# Patient Record
Sex: FEMALE
[De-identification: names, ages, dates, MRNs, and addresses within clinical notes are randomized per-mention and may not be internally consistent; named-entity substitution may affect disease eponyms.]

---

## 2019-01-22 ENCOUNTER — HOSPITAL ENCOUNTER (OUTPATIENT)
Dept: HOSPITAL 14 - H.ER | Age: 49
Setting detail: OBSERVATION
LOS: 2 days | Discharge: HOME | End: 2019-01-24
Attending: FAMILY MEDICINE | Admitting: FAMILY MEDICINE
Payer: COMMERCIAL

## 2019-01-22 DIAGNOSIS — Z23: ICD-10-CM

## 2019-01-22 DIAGNOSIS — J40: Primary | ICD-10-CM

## 2019-01-22 DIAGNOSIS — E66.9: ICD-10-CM

## 2019-01-22 PROCEDURE — 84484 ASSAY OF TROPONIN QUANT: CPT

## 2019-01-22 PROCEDURE — 90471 IMMUNIZATION ADMIN: CPT

## 2019-01-22 PROCEDURE — 83880 ASSAY OF NATRIURETIC PEPTIDE: CPT

## 2019-01-22 PROCEDURE — 36415 COLL VENOUS BLD VENIPUNCTURE: CPT

## 2019-01-22 PROCEDURE — 85025 COMPLETE CBC W/AUTO DIFF WBC: CPT

## 2019-01-22 PROCEDURE — 87804 INFLUENZA ASSAY W/OPTIC: CPT

## 2019-01-22 PROCEDURE — 81025 URINE PREGNANCY TEST: CPT

## 2019-01-22 PROCEDURE — 71046 X-RAY EXAM CHEST 2 VIEWS: CPT

## 2019-01-22 PROCEDURE — 90732 PPSV23 VACC 2 YRS+ SUBQ/IM: CPT

## 2019-01-22 PROCEDURE — 80053 COMPREHEN METABOLIC PANEL: CPT

## 2019-01-22 PROCEDURE — 80048 BASIC METABOLIC PNL TOTAL CA: CPT

## 2019-01-22 PROCEDURE — 94640 AIRWAY INHALATION TREATMENT: CPT

## 2019-01-22 PROCEDURE — 99281 EMR DPT VST MAYX REQ PHY/QHP: CPT

## 2019-01-22 NOTE — ED PDOC
HPI: SOB/CHF/COPD


Time Seen by Provider: 19 23:19


Chief Complaint (Nursing): Shortness Of Breath


Chief Complaint (Provider): sob


History Per: Patient


History/Exam Limitations: no limitations


Onset/Duration Of Symptoms: Days (1 month)


Current Symptoms Are (Timing): Still Present


Additional Complaint(s): 





47 y/o female presents for evaluation of dry cough, shortness of breath x 1 

month.  Patient was seen by Urgent Care and PMD multiple times and prescribed 

albuterol inhaler and nebulizer without improvement.  Patient sent to ED by PMD 

for further evaluation.  Denies fever, nausea/vomiting, chest pain, 

palpitations, leg pain/swelling, recent travel, OCP use, tobacco use.  Last 

albuterol neb treatment prior to arrival





Past Medical History


Reviewed: Historical Data, Nursing Documentation, Vital Signs


Vital Signs: 





                                Last Vital Signs











Temp  97.5 F L  19 23:10


 


Pulse  109 H  19 23:10


 


Resp  26 H  19 23:10


 


BP  144/84   19 23:10


 


Pulse Ox  95   19 23:10














- Medical History


PMH: No Chronic Diseases





- Surgical History


Surgical History: 





- Family History


Family History: States: No Known Family Hx





- Living Arrangements


Living Arrangements: With Family





- Social History


Current smoker - smoking cessation education provided: No


Alcohol: None


Drugs: Denies





- Home Medications


Home Medications: 


                                Ambulatory Orders











 Medication  Instructions  Recorded


 


Albuterol 0.083% [Albuterol 3 ml IH Q4 PRN 19





Sulfate 3 Ml]  


 


Carvedilol [Coreg] 3.125 mg PO DAILY 19


 


LORazepam [Ativan] 0.5 tab PO DAILY PRN 19


 


Oxybutynin [Oxybutynin Chloride] 5 mg PO DAILY 19


 


Oxycodone HCl/Acetaminophen 1 tab PO Q4 PRN 19





[Acetaminophen-Oxycodone 325 mg-5  





mg]  


 


Patient Own Control 1 unit PO Q30D 19


 


Patient Own Control 2 puff PO BID 19


 


Patient Own Control 600 tab PO BID 19


 


Promethazine HCl/Codeine 5 ml PO Q4 PRN 19





[Prometh-Codein 6.25-10 mg/5 ml]  














- Allergies


Allergies/Adverse Reactions: 


                                    Allergies











Allergy/AdvReac Type Severity Reaction Status Date / Time


 


No Known Allergies Allergy   Verified 19 23:10














Review of Systems


ROS Statement: Except As Marked, All Systems Reviewed And Found Negative


Respiratory: Positive for: Cough, Shortness of Breath





Physical Exam





- Reviewed


Nursing Documentation Reviewed: Yes


Vital Signs Reviewed: Yes





- Physical Exam


Appears: Positive for: Well, Non-toxic, Uncomfortable


Head Exam: Positive for: ATRAUMATIC, NORMAL INSPECTION, NORMOCEPHALIC


Skin: Positive for: Normal Color


Eye Exam: Positive for: Normal appearance


ENT: Positive for: Normal ENT Inspection


Cardiovascular/Chest: Positive for: Regular Rate, Rhythm


Respiratory: Positive for: Wheezing (diffuse expiratory wheezing)


Gastrointestinal/Abdominal: Positive for: Normal Exam


Back: Positive for: Normal Inspection


Extremity: Positive for: Normal ROM


Neurologic/Psych: Positive for: Alert, Oriented (x3)





- Laboratory Results


Result Diagrams: 


                                 19 00:10





                                 19 00:23





- ECG


ECG: Positive for: Viewed By Me (reviewed by ED attending)


ECG Rhythm: Positive for: Sinus Tachycardia


O2 Sat by Pulse Oximetry: 95





- Radiology


X-Ray: Viewed By Me


X-Ray Interpretation: No Acute Disease





- Progress


ED Course And Treament: 





-cbc


-cmp


-troponin


-bnp


-cxr


-ekg


-duoneb x 3


-IV solumedrol








Case discussed with Dr. Velez for placement in observation telemetry for 

failed outpatient treatment











Disposition





- Clinical Impression


Clinical Impression: 


 Bronchitis, Dyspnea








- Patient ED Disposition


Is Patient to be Admitted: Yes





- Disposition


Disposition Time: 00:03


Condition: FAIR

## 2019-01-23 LAB
ALBUMIN SERPL-MCNC: 4 G/DL (ref 3.5–5)
ALBUMIN/GLOB SERPL: 1.1 {RATIO} (ref 1–2.1)
ALT SERPL-CCNC: 30 U/L (ref 9–52)
AST SERPL-CCNC: 25 U/L (ref 14–36)
BASOPHILS # BLD AUTO: 0.1 K/UL (ref 0–0.2)
BASOPHILS NFR BLD: 1.1 % (ref 0–2)
BNP SERPL-MCNC: < 11.1 PG/ML (ref 0–450)
BUN SERPL-MCNC: 12 MG/DL (ref 7–17)
CALCIUM SERPL-MCNC: 9 MG/DL (ref 8.4–10.2)
EOSINOPHIL # BLD AUTO: 1.2 K/UL (ref 0–0.7)
EOSINOPHIL NFR BLD: 9.8 % (ref 0–4)
ERYTHROCYTE [DISTWIDTH] IN BLOOD BY AUTOMATED COUNT: 15.3 % (ref 11.5–14.5)
GFR NON-AFRICAN AMERICAN: > 60
HGB BLD-MCNC: 12.8 G/DL (ref 12–16)
LYMPHOCYTES # BLD AUTO: 4.4 K/UL (ref 1–4.3)
LYMPHOCYTES NFR BLD AUTO: 36.5 % (ref 20–40)
MCH RBC QN AUTO: 21.6 PG (ref 27–31)
MCHC RBC AUTO-ENTMCNC: 31.8 G/DL (ref 33–37)
MCV RBC AUTO: 68 FL (ref 81–99)
MONOCYTES # BLD: 0.6 K/UL (ref 0–0.8)
MONOCYTES NFR BLD: 5 % (ref 0–10)
NEUTROPHILS # BLD: 5.7 K/UL (ref 1.8–7)
NEUTROPHILS NFR BLD AUTO: 47.6 % (ref 50–75)
NRBC BLD AUTO-RTO: 0.1 % (ref 0–0)
PLATELET # BLD: 206 K/UL (ref 130–400)
PMV BLD AUTO: 9.6 FL (ref 7.2–11.7)
RBC # BLD AUTO: 5.94 MIL/UL (ref 3.8–5.2)
WBC # BLD AUTO: 12.1 K/UL (ref 4.8–10.8)

## 2019-01-23 RX ADMIN — LEVALBUTEROL SCH MG: 0.63 SOLUTION RESPIRATORY (INHALATION) at 19:01

## 2019-01-23 RX ADMIN — IPRATROPIUM BROMIDE AND ALBUTEROL SULFATE SCH ML: .5; 3 SOLUTION RESPIRATORY (INHALATION) at 13:57

## 2019-01-23 RX ADMIN — METHYLPREDNISOLONE SODIUM SUCCINATE SCH MG: 40 INJECTION, POWDER, FOR SOLUTION INTRAMUSCULAR; INTRAVENOUS at 18:19

## 2019-01-23 RX ADMIN — IPRATROPIUM BROMIDE AND ALBUTEROL SULFATE SCH ML: .5; 3 SOLUTION RESPIRATORY (INHALATION) at 11:48

## 2019-01-23 NOTE — RAD
Date of service: 



01/23/2019



HISTORY:

 cough, sob 



COMPARISON:

No prior.



TECHNIQUE:

Chest PA and lateral



FINDINGS:



LUNGS:

No active pulmonary disease.



PLEURA:

No significant pleural effusion identified. No pneumothorax apparent.



CARDIOVASCULAR:

No aortic atherosclerotic calcification present.



Normal cardiac size. No pulmonary vascular congestion. 



OSSEOUS STRUCTURES:

Spinal degenerative changes.



VISUALIZED UPPER ABDOMEN:

Normal.



OTHER FINDINGS:

None.



IMPRESSION:

No active disease.

## 2019-01-23 NOTE — CP.PCM.HP
<Danita Brito - Last Filed: 19 11:02>





History of Present Illness





- History of Present Illness


History of Present Illness: 





CC: dyspnea


HPI: 47 YO obese female with no sig PMHx presented to [facility] for cough and 

dyspnea. Pt states that her symptoms started about 2 months ago and 

significantly worsened. Dyspnea is worse with ambulation, cough is dry and 

nonproductive. Pt at one point took PO abx given by urgent care but her symptoms

persisted. Denies fever, chest pain, palpitations.





PMD: Dr. Forman 


PMHx: denies 


Srughx: 


FHx: CAD and MI 


Shx: denies smoking, ETOH and illicit dug use 


Allergies: NKDA 





Present on Admission





- Present on Admission


Any Indicators Present on Admission: No





Review of Systems





- Constitutional


Constitutional: absent: Chills, Fever





- Cardiovascular


Cardiovascular: Dyspnea.  absent: Chest Pain, Palpitations





- Respiratory


Respiratory: Cough, Dyspnea, Dyspnea on Exertion, Wheezing.  absent: Hemoptysis





- Gastrointestinal


Gastrointestinal: absent: Abdominal Pain





Past Patient History





- Past Medical History & Family History


Past Medical History?: Yes





- Past Social History


Smoking Status: Never Smoked


Alcohol: None


Drugs: Denies


Home Situation {Lives}: With Family





- PULMONARY


Hx Respiratory Disorders: Yes


Hx Asthma: Yes





- NEUROLOGICAL


Hx Neurological Disorder: No





- HEENT


Hx HEENT Problems: Yes


Other/Comment: uses reading glasses





- RENAL


Hx Chronic Kidney Disease: No





- ENDOCRINE/METABOLIC


Hx Endocrine Disorders: No





- HEMATOLOGICAL/ONCOLOGICAL


Hx AIDS: No


Hx Human Immunodeficiency Virus (HIV): No





- INTEGUMENTARY


Hx Dermatological Problems: No





- MUSCULOSKELETAL/RHEUMATOLOGICAL


Hx Falls: No





- GASTROINTESTINAL


Hx Gastrointestinal Disorders: No





- GENITOURINARY/GYNECOLOGICAL


Hx Genitourinary Disorders: No





- PSYCHIATRIC


Hx Psychophysiologic Disorder: No


Hx Substance Use: No





- SURGICAL HISTORY


Hx Surgeries: Yes


Hx  Section: Yes (x1)





- ANESTHESIA


Hx Anesthesia: Yes


Hx Anesthesia Reactions: No


Hx Malignant Hyperthermia: No





Meds


Allergies/Adverse Reactions: 


                                    Allergies











Allergy/AdvReac Type Severity Reaction Status Date / Time


 


No Known Allergies Allergy   Verified 19 23:10














Physical Exam





- Constitutional


Appears: No Acute Distress, Other (short of breath when speaking )





- Head Exam


Head Exam: NORMAL INSPECTION





- Eye Exam


Eye Exam: Normal appearance





- ENT Exam


ENT Exam: Mucous Membranes Moist





- Respiratory Exam


Respiratory Exam: Wheezes (b/l in the lower lobes, expiratory ), NORMAL 

BREATHING PATTERN.  absent: Rales, Stridor





- Cardiovascular Exam


Cardiovascular Exam: REGULAR RHYTHM, +S1, +S2





- GI/Abdominal Exam


GI & Abdominal Exam: Distended, Normal Bowel Sounds, Soft.  absent: Tenderness





- Extremities Exam


Extremities exam: Positive for: normal inspection.  Negative for: calf 

tenderness, pedal edema





- Neurological Exam


Neurological exam: Alert, Oriented x3





Results





- Vital Signs


Recent Vital Signs: 





                                Last Vital Signs











Temp  97.3 F L  19 08:11


 


Pulse  103 H  19 08:11


 


Resp  20   19 08:11


 


BP  134/62   19 08:11


 


Pulse Ox  95   19 08:11














- Labs


Result Diagrams: 


                                 19 00:10





                                 19 00:23


Labs: 





                         Laboratory Results - last 24 hr











  19





  00:10 00:23 00:23


 


WBC  12.1 H  


 


RBC  5.94 H  


 


Hgb  12.8  


 


Hct  40.4  


 


MCV  68.0 L  


 


MCH  21.6 L  


 


MCHC  31.8 L  


 


RDW  15.3 H  


 


Plt Count  206  


 


MPV  9.6  


 


Neut % (Auto)  47.6 L  


 


Lymph % (Auto)  36.5  


 


Mono % (Auto)  5.0  


 


Eos % (Auto)  9.8 H  


 


Baso % (Auto)  1.1  


 


Neut # (Auto)  5.7  


 


Lymph # (Auto)  4.4 H  


 


Mono # (Auto)  0.6  


 


Eos # (Auto)  1.2 H  


 


Baso # (Auto)  0.1  


 


Sodium   137 


 


Potassium   3.8 


 


Chloride   105 


 


Carbon Dioxide   24 


 


Anion Gap   12 


 


BUN   12 


 


Creatinine   0.9 


 


Est GFR ( Amer)   > 60 


 


Est GFR (Non-Af Amer)   > 60 


 


Random Glucose   104 


 


Calcium   9.0 


 


Total Bilirubin   0.4 


 


AST   25 


 


ALT   30 


 


Alkaline Phosphatase   92 


 


Troponin I   < 0.0120 


 


NT-Pro-B Natriuret Pep   < 11.1 


 


Total Protein   7.5 


 


Albumin   4.0 


 


Globulin   3.5 


 


Albumin/Globulin Ratio   1.1 


 


Influenza Typ A,B (EIA)    Negative for flu a/b














Assessment & Plan





- Assessment and Plan (Free Text)


Assessment: 





Assessment/plan:


47 YO obese female with no sig PMHx is admitted for respiratory distress and 

bronchitis. 





Bronchitis 


-likely 2/2 to URI, inflammatory 


-CXR neg, no active cardiopulmonary disease


-IV steroids, Duonab Hanane 


-no need for abx at this time given afebrile and neg CXR 





Respiratory distress


-improving 


-likely 2/2 to bronchitis 


-plan as ordered 





DVT prolx: Lovenox SC  





<Ancelmo Velez - Last Filed: 19 15:17>





Results





- Vital Signs


Recent Vital Signs: 





                                Last Vital Signs











Temp  97.8 F   19 13:00


 


Pulse  108 H  19 13:00


 


Resp  20   19 13:00


 


BP  113/70   19 13:00


 


Pulse Ox  93 L  19 13:00














- Labs


Result Diagrams: 


                                 19 00:10





                                 19 00:23


Labs: 





                         Laboratory Results - last 24 hr











  19





  00:10 00:23 00:23


 


WBC  12.1 H  


 


RBC  5.94 H  


 


Hgb  12.8  


 


Hct  40.4  


 


MCV  68.0 L  


 


MCH  21.6 L  


 


MCHC  31.8 L  


 


RDW  15.3 H  


 


Plt Count  206  


 


MPV  9.6  


 


Neut % (Auto)  47.6 L  


 


Lymph % (Auto)  36.5  


 


Mono % (Auto)  5.0  


 


Eos % (Auto)  9.8 H  


 


Baso % (Auto)  1.1  


 


Neut # (Auto)  5.7  


 


Lymph # (Auto)  4.4 H  


 


Mono # (Auto)  0.6  


 


Eos # (Auto)  1.2 H  


 


Baso # (Auto)  0.1  


 


Sodium   137 


 


Potassium   3.8 


 


Chloride   105 


 


Carbon Dioxide   24 


 


Anion Gap   12 


 


BUN   12 


 


Creatinine   0.9 


 


Est GFR ( Amer)   > 60 


 


Est GFR (Non-Af Amer)   > 60 


 


Random Glucose   104 


 


Calcium   9.0 


 


Total Bilirubin   0.4 


 


AST   25 


 


ALT   30 


 


Alkaline Phosphatase   92 


 


Troponin I   < 0.0120 


 


NT-Pro-B Natriuret Pep   < 11.1 


 


Total Protein   7.5 


 


Albumin   4.0 


 


Globulin   3.5 


 


Albumin/Globulin Ratio   1.1 


 


Influenza Typ A,B (EIA)    Negative for flu a/b














Attending/Attestation





- Attestation


I have personally seen and examined this patient.: Yes


I have fully participated in the care of the patient.: Yes


I have reviewed all pertinent clinical information: Yes

## 2019-01-24 VITALS
HEART RATE: 105 BPM | TEMPERATURE: 97.4 F | OXYGEN SATURATION: 98 % | RESPIRATION RATE: 20 BRPM | SYSTOLIC BLOOD PRESSURE: 130 MMHG | DIASTOLIC BLOOD PRESSURE: 78 MMHG

## 2019-01-24 LAB
ANISOCYTOSIS BLD QL SMEAR: SLIGHT
BASOPHILS # BLD AUTO: 0 K/UL (ref 0–0.2)
BASOPHILS NFR BLD: 0.1 % (ref 0–2)
BASOPHILS NFR BLD: 1 % (ref 0–2)
BUN SERPL-MCNC: 20 MG/DL (ref 7–17)
CALCIUM SERPL-MCNC: 9.8 MG/DL (ref 8.4–10.2)
EOSINOPHIL # BLD AUTO: 0 K/UL (ref 0–0.7)
EOSINOPHIL NFR BLD: 0 % (ref 0–4)
ERYTHROCYTE [DISTWIDTH] IN BLOOD BY AUTOMATED COUNT: 15.5 % (ref 11.5–14.5)
GFR NON-AFRICAN AMERICAN: > 60
HGB BLD-MCNC: 12.8 G/DL (ref 12–16)
LG PLATELETS BLD QL SMEAR: PRESENT
LYMPHOCYTE: 14 % (ref 20–50)
LYMPHOCYTES # BLD AUTO: 1.9 K/UL (ref 1–4.3)
LYMPHOCYTES NFR BLD AUTO: 9.2 % (ref 20–40)
MCH RBC QN AUTO: 21.4 PG (ref 27–31)
MCHC RBC AUTO-ENTMCNC: 31.3 G/DL (ref 33–37)
MCV RBC AUTO: 68.4 FL (ref 81–99)
MICROCYTES BLD QL SMEAR: (no result)
MONOCYTE: 2 % (ref 0–10)
MONOCYTES # BLD: 0.4 K/UL (ref 0–0.8)
MONOCYTES NFR BLD: 2.1 % (ref 0–10)
NEUTROPHILS # BLD: 18.2 K/UL (ref 1.8–7)
NEUTROPHILS NFR BLD AUTO: 80 % (ref 42–75)
NEUTROPHILS NFR BLD AUTO: 88.6 % (ref 50–75)
NEUTS BAND NFR BLD: 3 % (ref 0–2)
NRBC BLD AUTO-RTO: 0 % (ref 0–0)
PLATELET # BLD EST: NORMAL 10*3/UL
PLATELET # BLD: 244 K/UL (ref 130–400)
PMV BLD AUTO: 9.7 FL (ref 7.2–11.7)
RBC # BLD AUTO: 5.96 MIL/UL (ref 3.8–5.2)
TOTAL CELLS COUNTED BLD: 100
WBC # BLD AUTO: 20.6 K/UL (ref 4.8–10.8)

## 2019-01-24 RX ADMIN — METHYLPREDNISOLONE SODIUM SUCCINATE SCH MG: 40 INJECTION, POWDER, FOR SOLUTION INTRAMUSCULAR; INTRAVENOUS at 01:02

## 2019-01-24 RX ADMIN — LEVALBUTEROL SCH MG: 0.63 SOLUTION RESPIRATORY (INHALATION) at 08:14

## 2019-01-24 RX ADMIN — LEVALBUTEROL SCH MG: 0.63 SOLUTION RESPIRATORY (INHALATION) at 01:06

## 2019-01-24 RX ADMIN — METHYLPREDNISOLONE SODIUM SUCCINATE SCH MG: 40 INJECTION, POWDER, FOR SOLUTION INTRAMUSCULAR; INTRAVENOUS at 10:01
